# Patient Record
Sex: FEMALE | Race: WHITE
[De-identification: names, ages, dates, MRNs, and addresses within clinical notes are randomized per-mention and may not be internally consistent; named-entity substitution may affect disease eponyms.]

---

## 2017-10-06 ENCOUNTER — HOSPITAL ENCOUNTER (INPATIENT)
Dept: HOSPITAL 84 - D.ER | Age: 47
LOS: 5 days | Discharge: HOME | DRG: 673 | End: 2017-10-11
Attending: FAMILY MEDICINE | Admitting: FAMILY MEDICINE
Payer: OTHER GOVERNMENT

## 2017-10-06 VITALS — BODY MASS INDEX: 45.7 KG/M2 | BODY MASS INDEX: 47 KG/M2

## 2017-10-06 VITALS — SYSTOLIC BLOOD PRESSURE: 164 MMHG | DIASTOLIC BLOOD PRESSURE: 83 MMHG

## 2017-10-06 DIAGNOSIS — D50.9: ICD-10-CM

## 2017-10-06 DIAGNOSIS — D63.1: ICD-10-CM

## 2017-10-06 DIAGNOSIS — N17.9: ICD-10-CM

## 2017-10-06 DIAGNOSIS — E83.51: ICD-10-CM

## 2017-10-06 DIAGNOSIS — N18.6: ICD-10-CM

## 2017-10-06 DIAGNOSIS — Q60.0: ICD-10-CM

## 2017-10-06 DIAGNOSIS — E87.2: ICD-10-CM

## 2017-10-06 DIAGNOSIS — I12.0: Primary | ICD-10-CM

## 2017-10-06 LAB
ALBUMIN SERPL-MCNC: 3.1 G/DL (ref 3.4–5)
ALP SERPL-CCNC: 51 U/L (ref 46–116)
ALT SERPL-CCNC: 51 U/L (ref 10–68)
ANION GAP SERPL CALC-SCNC: 21.4 MMOL/L (ref 8–16)
APPEARANCE UR: (no result)
APPEARANCE UR: CLEAR
BACTERIA #/AREA URNS HPF: (no result) /HPF
BACTERIA #/AREA URNS HPF: (no result) /HPF
BASOPHILS NFR BLD AUTO: 0.1 % (ref 0–2)
BILIRUB SERPL-MCNC: 0.4 MG/DL (ref 0.2–1.3)
BILIRUB SERPL-MCNC: NEGATIVE MG/DL
BILIRUB SERPL-MCNC: NEGATIVE MG/DL
BUN SERPL-MCNC: 100 MG/DL (ref 7–18)
C3 SERPL-MCNC: 98 MG/DL (ref 90–207)
C4 SERPL-MCNC: 20.4 MG/DL (ref 17.4–52.2)
CALCIUM SERPL-MCNC: 6 MG/DL (ref 8.5–10.1)
CHLORIDE SERPL-SCNC: 105 MMOL/L (ref 98–107)
CO2 SERPL-SCNC: 17.7 MMOL/L (ref 21–32)
COLOR UR: (no result)
COLOR UR: (no result)
CREAT SERPL-MCNC: 11.7 MG/DL (ref 0.6–1.3)
CREATININE - URINE: 24.6 MG/DL (ref 30–125)
EOSINOPHIL NFR BLD: 2.4 % (ref 0–7)
ERYTHROCYTE [DISTWIDTH] IN BLOOD BY AUTOMATED COUNT: 13.6 % (ref 11.5–14.5)
GLOBULIN SER-MCNC: 3 G/L
GLUCOSE SERPL-MCNC: 100 MG/DL
GLUCOSE SERPL-MCNC: 116 MG/DL (ref 74–106)
GLUCOSE SERPL-MCNC: NEGATIVE MG/DL
HCT VFR BLD CALC: 22.9 % (ref 36–48)
HGB BLD-MCNC: 7.5 G/DL (ref 12–16)
IMM GRANULOCYTES NFR BLD: 0.4 % (ref 0–5)
KETONES UR STRIP-MCNC: NEGATIVE MG/DL
KETONES UR STRIP-MCNC: NEGATIVE MG/DL
LYMPHOCYTES NFR BLD AUTO: 9.2 % (ref 15–50)
MCH RBC QN AUTO: 29.8 PG (ref 26–34)
MCHC RBC AUTO-ENTMCNC: 32.8 G/DL (ref 31–37)
MCV RBC: 90.9 FL (ref 80–100)
MONOCYTES NFR BLD: 6.2 % (ref 2–11)
NEUTROPHILS NFR BLD AUTO: 81.7 % (ref 40–80)
NITRITE UR-MCNC: NEGATIVE MG/ML
NITRITE UR-MCNC: NEGATIVE MG/ML
OSMOLALITY SERPL CALC.SUM OF ELEC: 309 MOSM/KG (ref 275–300)
PH UR STRIP: 5 [PH] (ref 5–6)
PH UR STRIP: 6 [PH] (ref 5–6)
PLATELET # BLD: 371 10X3/UL (ref 130–400)
PMV BLD AUTO: 8.6 FL (ref 7.4–10.4)
POTASSIUM SERPL-SCNC: 5.1 MMOL/L (ref 3.5–5.1)
PROT SERPL-MCNC: 6.1 G/DL (ref 6.4–8.2)
PROT UR-MCNC: (no result) MG/DL
PROT UR-MCNC: (no result) MG/DL
PROT UR-MCNC: 105.6 MG/DL (ref 0–11.9)
PROT/CREAT UR: 4.3 MG/G
RBC # BLD AUTO: 2.52 10X6/UL (ref 4–5.4)
RBC #/AREA URNS HPF: (no result) /HPF (ref 0–5)
SODIUM SERPL-SCNC: 139 MMOL/L (ref 136–145)
SODIUM UR-SCNC: 70 MMOL/L (ref 20–110)
SP GR UR STRIP: 1.01 (ref 1–1.02)
SP GR UR STRIP: 1.01 (ref 1–1.02)
UROBILINOGEN UR-MCNC: NORMAL MG/DL
UROBILINOGEN UR-MCNC: NORMAL MG/DL
WBC # BLD AUTO: 10.2 10X3/UL (ref 4.8–10.8)
WBC #/AREA URNS HPF: (no result) /HPF (ref 0–5)
WBC #/AREA URNS HPF: (no result) /HPF (ref 0–5)

## 2017-10-06 NOTE — NUR
PT RECEIVED TO ROOM 2132, VIA WHEELCHAIR, ORIENTED PT TO ROOM AND CALL LIGHT.
EXPLAINED RATIONAL FOR SCD'S TO PT, PT REFUSED TO WEAR SCD'S AT THIS TIME.
PT'S BP A LITTLE HIGH, WILL ADMINISTER CLONODIN SCHEDULED FOR 1500. PT
ASSESSED AT THIS TIME. PT DENIES ANY NEEDS, CALL LIGHT IN REACH, WILL START
PLAN OF CARE.

## 2017-10-06 NOTE — NUR
PRBC INFUSING. VSS. DENIES ANY NEEDS OR PAIN AT THIS TIME. BED IN LOW
POSITION, CALL LIGHT WITHIN REACH.

## 2017-10-06 NOTE — NUR
ASKED PT TO APPLY SCD'S PER ORDER. PT STATED SHE WANTED TO WAIT TO PUT THEM ON
UNTIL THE MORNING BECAUSE THEY INTERFERE WITH HER GETTING REST.

## 2017-10-06 NOTE — NUR
PRBC INFUSING  ML/HR. VSS. PT RESTING QUIETLY IN BED.  PRESENT IN
ROOM. DENIES ANY PAIN OR NEEDS AT THIS TIME.

## 2017-10-06 NOTE — NUR
PT IN BED RESTING QUIETLY. RECIEVING BLOOD TRANSFUSION. VSS. DENIES ANY PAIN
OR DISCOMFORT AT THIS TIME. BED IN LOW POSITION, CALL LIGHT WITHIN REACH.

## 2017-10-06 NOTE — NUR
SECOND UNIT OF PRBC STARTED. BASELINE VITALS BP:148/87, HR:82, TEMP:98.0,
RR:16. VSS. DENIES ANY PAIN OR NEEDS AT THIS TIME. BED IN LOW POSITION, CALL
LIGHT WITHIN REACH.

## 2017-10-06 NOTE — NUR
FIRST UNIT OF PRBC'S STARTED INFUSING AT THIS TIME. VITAL SIGNS STABLE. PT
DENIES ANY NEEDS AT THIS TIME. CALL LIGHT IN REACH, NAD NOTED, WILL CONTINUE
TO MONITOR.
 
1650- BLOOD CONSENT SIGNED AND PLACED ON CHART.

## 2017-10-06 NOTE — NUR
PT IN BED RESTING QUIETLY. VSS. PRBC INFUSING  ML/HR. DENIES ANY NEEDS
AT THIS TIME. BED IN LOW POSITON, CALL LIGHT WITHIN REACH.

## 2017-10-07 VITALS — SYSTOLIC BLOOD PRESSURE: 163 MMHG | DIASTOLIC BLOOD PRESSURE: 93 MMHG

## 2017-10-07 VITALS — SYSTOLIC BLOOD PRESSURE: 138 MMHG | DIASTOLIC BLOOD PRESSURE: 94 MMHG

## 2017-10-07 VITALS — DIASTOLIC BLOOD PRESSURE: 87 MMHG | SYSTOLIC BLOOD PRESSURE: 166 MMHG

## 2017-10-07 VITALS — DIASTOLIC BLOOD PRESSURE: 87 MMHG | SYSTOLIC BLOOD PRESSURE: 143 MMHG

## 2017-10-07 VITALS — SYSTOLIC BLOOD PRESSURE: 146 MMHG | DIASTOLIC BLOOD PRESSURE: 89 MMHG

## 2017-10-07 VITALS — SYSTOLIC BLOOD PRESSURE: 143 MMHG | DIASTOLIC BLOOD PRESSURE: 96 MMHG

## 2017-10-07 LAB
ANION GAP SERPL CALC-SCNC: 21.4 MMOL/L (ref 8–16)
ANION GAP SERPL CALC-SCNC: 23.2 MMOL/L (ref 8–16)
BASOPHILS NFR BLD AUTO: 0.2 % (ref 0–2)
BUN SERPL-MCNC: 89 MG/DL (ref 7–18)
BUN SERPL-MCNC: 90 MG/DL (ref 7–18)
CALCIUM SERPL-MCNC: 5.9 MG/DL (ref 8.5–10.1)
CALCIUM SERPL-MCNC: 6.2 MG/DL (ref 8.5–10.1)
CHLORIDE SERPL-SCNC: 102 MMOL/L (ref 98–107)
CHLORIDE SERPL-SCNC: 103 MMOL/L (ref 98–107)
CO2 SERPL-SCNC: 16.5 MMOL/L (ref 21–32)
CO2 SERPL-SCNC: 18.5 MMOL/L (ref 21–32)
CREAT SERPL-MCNC: 11.1 MG/DL (ref 0.6–1.3)
CREAT SERPL-MCNC: 11.2 MG/DL (ref 0.6–1.3)
EOSINOPHIL NFR BLD: 1.6 % (ref 0–7)
ERYTHROCYTE [DISTWIDTH] IN BLOOD BY AUTOMATED COUNT: 15.2 % (ref 11.5–14.5)
ERYTHROCYTE [SEDIMENTATION RATE] IN BLOOD: 20 MM/HR (ref 0–20)
GLUCOSE SERPL-MCNC: 105 MG/DL (ref 74–106)
GLUCOSE SERPL-MCNC: 97 MG/DL (ref 74–106)
HCT VFR BLD CALC: 24.7 % (ref 36–48)
HGB BLD-MCNC: 8.1 G/DL (ref 12–16)
IMM GRANULOCYTES NFR BLD: 0.1 % (ref 0–5)
LYMPHOCYTES NFR BLD AUTO: 8 % (ref 15–50)
MCH RBC QN AUTO: 29.2 PG (ref 26–34)
MCHC RBC AUTO-ENTMCNC: 32.8 G/DL (ref 31–37)
MCV RBC: 89.2 FL (ref 80–100)
MONOCYTES NFR BLD: 7.1 % (ref 2–11)
NEUTROPHILS NFR BLD AUTO: 83 % (ref 40–80)
OSMOLALITY SERPL CALC.SUM OF ELEC: 300 MOSM/KG (ref 275–300)
OSMOLALITY SERPL CALC.SUM OF ELEC: 303 MOSM/KG (ref 275–300)
PLATELET # BLD: 323 10X3/UL (ref 130–400)
PMV BLD AUTO: 8.9 FL (ref 7.4–10.4)
POTASSIUM SERPL-SCNC: 4.7 MMOL/L (ref 3.5–5.1)
POTASSIUM SERPL-SCNC: 4.9 MMOL/L (ref 3.5–5.1)
RBC # BLD AUTO: 2.77 10X6/UL (ref 4–5.4)
SODIUM SERPL-SCNC: 137 MMOL/L (ref 136–145)
SODIUM SERPL-SCNC: 138 MMOL/L (ref 136–145)
WBC # BLD AUTO: 10 10X3/UL (ref 4.8–10.8)

## 2017-10-07 NOTE — NUR
SECOND UNIT OF PRBC DONE INFUSING. VSS. PT DENIES ANY PAIN OR NEEDS AT THIS
TIME. BED IN LOW POSITION, CALL LIGHT WITHIN REACH.

## 2017-10-07 NOTE — NUR
RECEIVED REPORT. ASSUMED CARE OF PATIENT. CALL LIGHT WITHIN REACH. PATIENT
LYING IN BED WITH EYES OPEN. STATES SHE FEELS BETTER THIS AM, FEELS LIKE SHE
HAS MORE ENERGY. RESP EVEN AND UNLABORED. STOOL SPECIMEN COLLECTED AT THIS
TIME. NO DISTRESS.

## 2017-10-07 NOTE — NUR
PT RESTING IN BED.  AT BEDSIDE. ALERT/ORIENTED. NA BICARB IV AT
100ML/HR INFUSING TO LEFT A/C. REFUSING SCDS. SEE SHIFT ASSESSMENT. CPOC.

## 2017-10-07 NOTE — NUR
ORDERED AS ROUTINE - STUDY EDITED TO DO AM ON 10/7/17 AS TECH ON CALL FOR
EMERGENCY ONLY.   GCAJOSE,RDMS  10/7/17

## 2017-10-07 NOTE — NUR
PAGED AGAIN. STILL AWAITING CALL BACK. CALLED CELL PHONE FOR [facility]
AND LEFT MESSAGE. WAITING CALL BACK.

## 2017-10-07 NOTE — NUR
RESTING IN BED WITH EYES OPEN. DENIES NEEDS. IV FLUIDS INFUSING AS ORDERED.
CALL LIGHT WITHIN REACH. NO DISTRESS.

## 2017-10-08 VITALS — SYSTOLIC BLOOD PRESSURE: 136 MMHG | DIASTOLIC BLOOD PRESSURE: 87 MMHG

## 2017-10-08 VITALS — SYSTOLIC BLOOD PRESSURE: 140 MMHG | DIASTOLIC BLOOD PRESSURE: 92 MMHG

## 2017-10-08 VITALS — DIASTOLIC BLOOD PRESSURE: 97 MMHG | SYSTOLIC BLOOD PRESSURE: 153 MMHG

## 2017-10-08 VITALS — SYSTOLIC BLOOD PRESSURE: 144 MMHG | DIASTOLIC BLOOD PRESSURE: 93 MMHG

## 2017-10-08 VITALS — DIASTOLIC BLOOD PRESSURE: 78 MMHG | SYSTOLIC BLOOD PRESSURE: 125 MMHG

## 2017-10-08 VITALS — DIASTOLIC BLOOD PRESSURE: 83 MMHG | SYSTOLIC BLOOD PRESSURE: 128 MMHG

## 2017-10-08 LAB
ANION GAP SERPL CALC-SCNC: 21 MMOL/L (ref 8–16)
BASOPHILS NFR BLD AUTO: 0.1 % (ref 0–2)
BUN SERPL-MCNC: 85 MG/DL (ref 7–18)
CALCIUM SERPL-MCNC: 6.1 MG/DL (ref 8.5–10.1)
CHLORIDE SERPL-SCNC: 104 MMOL/L (ref 98–107)
CHOLEST/HDLC SERPL: 2.5 RATIO (ref 2.3–4.1)
CO2 SERPL-SCNC: 17.8 MMOL/L (ref 21–32)
CREAT SERPL-MCNC: 11 MG/DL (ref 0.6–1.3)
EOSINOPHIL NFR BLD: 3.2 % (ref 0–7)
ERYTHROCYTE [DISTWIDTH] IN BLOOD BY AUTOMATED COUNT: 14.8 % (ref 11.5–14.5)
EST. AVERAGE GLUCOSE BLD GHB EST-MCNC: 103 MG/DL (ref 74–154)
FERRITIN SERPL-MCNC: 88 NG/ML (ref 3–244)
GLUCOSE SERPL-MCNC: 105 MG/DL (ref 74–106)
HBA1C MFR BLD: 5.2 % (ref 4.8–6)
HCT VFR BLD CALC: 24.4 % (ref 36–48)
HDLC SERPL-MCNC: 41 MG/DL (ref 32–96)
HGB BLD-MCNC: 8 G/DL (ref 12–16)
IMM GRANULOCYTES NFR BLD: 0.3 % (ref 0–5)
IRON SERPL-MCNC: 31 UG/DL (ref 35–150)
LDL-HDL RATIO: 1.3 RATIO (ref 1.5–3.5)
LDLC SERPL-MCNC: 54 MG/DL (ref 0–100)
LYMPHOCYTES NFR BLD AUTO: 14.2 % (ref 15–50)
MAGNESIUM SERPL-MCNC: 1.8 MG/DL (ref 1.8–2.4)
MCH RBC QN AUTO: 29.2 PG (ref 26–34)
MCHC RBC AUTO-ENTMCNC: 32.8 G/DL (ref 31–37)
MCV RBC: 89.1 FL (ref 80–100)
MONOCYTES NFR BLD: 7.7 % (ref 2–11)
NEUTROPHILS NFR BLD AUTO: 74.5 % (ref 40–80)
OSMOLALITY SERPL CALC.SUM OF ELEC: 301 MOSM/KG (ref 275–300)
PHOSPHATE SERPL-MCNC: 8.1 MG/DL (ref 2.5–4.9)
PLATELET # BLD: 280 10X3/UL (ref 130–400)
PMV BLD AUTO: 8.4 FL (ref 7.4–10.4)
POTASSIUM SERPL-SCNC: 4.8 MMOL/L (ref 3.5–5.1)
RBC # BLD AUTO: 2.74 10X6/UL (ref 4–5.4)
SAO2 % BLD FROM PO2: 13 % (ref 15–55)
SODIUM SERPL-SCNC: 138 MMOL/L (ref 136–145)
TIBC SERPL-MCNC: 226 UG/DL (ref 260–445)
TRIGL SERPL-MCNC: 48 MG/DL (ref 30–200)
UIBC SERPL-MCNC: 195 UG/DL (ref 150–375)
WBC # BLD AUTO: 7.8 10X3/UL (ref 4.8–10.8)

## 2017-10-08 NOTE — NUR
SPOKE WITH  AND RECEIVED ORDERS TO CHANGE ORDERS TO "LAPROSCOPIC
PERITONEAL DIALYSIS CATHETER PLACEMENT WITH  OR ".

## 2017-10-08 NOTE — NUR
SPOKE WITH JESSICA AT THIS TIME AFTER REVIEWING  PROGRESS NOTE. PATIENT
WAS CONSULTED FOR HEMESPLIT PLACEMENT BY  BUT  STATES THAT
PATIENT DOES NOT NEED HD AT THIS TIME, NEEDS PD CATHETER PLACEMENT AND CAN
START PD IN A COUPLE OF WEEKS. LU GAVE ORDERS TO CALL DR. JASSO AND
NOTIFY HIM OF THE CHANGE AND PLACE NEW ORDERS FOR PD CATHETER PLACEMENT.
 PAGED AT THIS TIME.

## 2017-10-08 NOTE — NUR
NEW BAG OF IVF UP AND INFUSING. PT RESTING WITH EYES CLOSED. RESPS
EVEN/NONLABORED. CPOC. CALL LIGHT IN REACH.

## 2017-10-08 NOTE — NUR
CONSENTS SIGNED AND ON THE CHART FOR TOMORROWS PROCEDURE. NPO AFTER MIDNIGHT
SIGN PLACED ON PATIENT DOOR AND MADE AWARE NOT TO EAT OR DRINK AFTER MIDNIGHT
TONIGHT. VERBALZIED HER UNDERSTANDING.

## 2017-10-08 NOTE — NUR
RECEIVED REPORT. ASSUMED CARE OF PATIENT. CALL LIGHT WITHIN REACH. PATIENT
RESTING WITH EYES OPEN. DENIES NEEDS THIS AM. STATES SHE SLEPT WELL LAST NOC.
IV FLUIDS INFUSING AS ORDERD. NO DISTRESS.

## 2017-10-08 NOTE — NUR
PT AWAKE, ALERT, ORIENTED, SITTING UP IN BED, DENIES ANY NEEDS. PT DENIES ANY
QUESTIONS ABOUT UPCOMING PROCEDURE TOMORROW. CONTINUE TO MONITOR CLOSELY. BED
LOW, CALL LIGHT IN REACH, SIDE RAILS X 2, HOB 10-15 DEGREES.

## 2017-10-09 VITALS — DIASTOLIC BLOOD PRESSURE: 100 MMHG | SYSTOLIC BLOOD PRESSURE: 158 MMHG

## 2017-10-09 VITALS — SYSTOLIC BLOOD PRESSURE: 168 MMHG | DIASTOLIC BLOOD PRESSURE: 102 MMHG

## 2017-10-09 VITALS — SYSTOLIC BLOOD PRESSURE: 142 MMHG | DIASTOLIC BLOOD PRESSURE: 93 MMHG

## 2017-10-09 VITALS — DIASTOLIC BLOOD PRESSURE: 90 MMHG | SYSTOLIC BLOOD PRESSURE: 128 MMHG

## 2017-10-09 VITALS — SYSTOLIC BLOOD PRESSURE: 145 MMHG | DIASTOLIC BLOOD PRESSURE: 86 MMHG

## 2017-10-09 VITALS — SYSTOLIC BLOOD PRESSURE: 140 MMHG | DIASTOLIC BLOOD PRESSURE: 86 MMHG

## 2017-10-09 LAB
ANION GAP SERPL CALC-SCNC: 21.3 MMOL/L (ref 8–16)
BASOPHILS NFR BLD AUTO: 0.1 % (ref 0–2)
BUN SERPL-MCNC: 83 MG/DL (ref 7–18)
CALCIUM SERPL-MCNC: 6.3 MG/DL (ref 8.5–10.1)
CHLORIDE SERPL-SCNC: 102 MMOL/L (ref 98–107)
CO2 SERPL-SCNC: 19.4 MMOL/L (ref 21–32)
CREAT SERPL-MCNC: 10.9 MG/DL (ref 0.6–1.3)
EOSINOPHIL NFR BLD: 3 % (ref 0–7)
ERYTHROCYTE [DISTWIDTH] IN BLOOD BY AUTOMATED COUNT: 14.6 % (ref 11.5–14.5)
GLUCOSE SERPL-MCNC: 107 MG/DL (ref 74–106)
HCT VFR BLD CALC: 24.8 % (ref 36–48)
HGB BLD-MCNC: 8.2 G/DL (ref 12–16)
IMM GRANULOCYTES NFR BLD: 0.2 % (ref 0–5)
LYMPHOCYTES NFR BLD AUTO: 12.4 % (ref 15–50)
MAGNESIUM SERPL-MCNC: 1.6 MG/DL (ref 1.8–2.4)
MCH RBC QN AUTO: 29.4 PG (ref 26–34)
MCHC RBC AUTO-ENTMCNC: 33.1 G/DL (ref 31–37)
MCV RBC: 88.9 FL (ref 80–100)
MONOCYTES NFR BLD: 6.9 % (ref 2–11)
NEUTROPHILS NFR BLD AUTO: 77.4 % (ref 40–80)
OSMOLALITY SERPL CALC.SUM OF ELEC: 300 MOSM/KG (ref 275–300)
PHOSPHATE SERPL-MCNC: 8 MG/DL (ref 2.5–4.9)
PLATELET # BLD: 307 10X3/UL (ref 130–400)
PMV BLD AUTO: 8.6 FL (ref 7.4–10.4)
POTASSIUM SERPL-SCNC: 4.7 MMOL/L (ref 3.5–5.1)
RBC # BLD AUTO: 2.79 10X6/UL (ref 4–5.4)
SODIUM SERPL-SCNC: 138 MMOL/L (ref 136–145)
WBC # BLD AUTO: 8.7 10X3/UL (ref 4.8–10.8)

## 2017-10-09 NOTE — NUR
MORNING MEDICATION GIVEN, ASSESSMENT DONE. PATIENT DENIES ANY NEEDS. BED LOW
AND LOCKED, CALL LIGHT IN REACH. CPOC.

## 2017-10-09 NOTE — NUR
DR JASSO AT BEDSIDE, EXPLAINED TO PATIENT THAT THE SURGERY GOT PUSHED BACK
DUE TO EMERGENCIES, AND HE IS GOING TO DO SURGERY TOMORROW AM, PATIENT STATES
UNDERSTANDING. WILL GET PATIENT A LUNCH TRAY. CPOC

## 2017-10-09 NOTE — NUR
Nutrition Follow Up:
Pt is NPO for PD Cath placement. Prior to this pt was eating 100% meal avg on
a renal diet. +BM 10/8/17. Wt stable. Meds and labs reviewed.
Rec resuming diet when medically feasible.
RD following.

## 2017-10-09 NOTE — NUR
PT AWAKE, ALERT, ORIENTED, DENIES ANY NEEDS. PT UNHOOKED FROM IV SO SHE CAN
TAKE A SHOWER. CONTINUE TO MONITOR.

## 2017-10-09 NOTE — NUR
PATIENT EATING DINNER, DENIES ANY NEEDS OR PAIN AT THIS TIME. PATIENT HAS R FA
IV WITH SODIUM BICARB INFUSING AT 100ML/HR. BED LOW AND LOCKED. CPOC

## 2017-10-09 NOTE — NUR
PT LYING IN BED, EYES CLOSED, RESPIRATIONS EVEN AND UNLABORED. CONTINUE TO
MONITOR CLOSELY. BED LOW, CALL LIGHT IN REACH, HOB FLAT, SIDE RAILS X 2.

## 2017-10-09 NOTE — NUR
RECIEVED REPORT ON PATIENT, PATIENT IS ALERT AND ORIENTED AT THIS TIME.
PATIENT HAS A L AC IV THAT IS SL AT THIS TIME. PATIENT BED IS LOW AND LOCKED.
PATIENT DENIES ANY NEEDS. CALL LIGHT IN REACH. WILL CONT TO MONITOR PATIENT.
CPOC

## 2017-10-09 NOTE — NUR
Patient Name: MILTON YOUNG
Encounter No: [id number]
: 1970
Primary Insurance: [employer] ACTIVE DUTY
Anticipated DC Date:
Planned Disposition: Home
 
 
DISCHARGE PLANNING NOTE: CM RECEIVED ORDER FOR OUTPATIENT DIALYSIS CLINIC
ARRANGEMENT. CM NOTIFIED [facility] COORDINATOR GIOVANY LAW,.
[phone] AND WILL FOLLOW UP WITH PT TOMORROW.
 
Aleksandr Miranda, CASE MANAGEMENT

## 2017-10-09 NOTE — NUR
ROUNDING NOTE: PT SITTING UP IN BED WATCHING TV IN BED. PT WAS PLANNED TO HAVE
PD CATH PLACEMENT TODAY, BUT HER PROCEDURE WAS BUMPED FOR EMERGENT PROCEDURES
THAT CAME IN; THEREFORE, HER PROCEDURE WILL BE TOMORROW MORNING AT 0900.
WILL RE-SIGN CONSENT AND PREOP PATIENT AGAIN IN THE MORNING. PT WITH RIGHT
FOREARM W/ SODIUM BICARB RUNNING AT 100CC/HR. PT DENIES ANY NEEDS. WILL CONT
TO MONITOR.

## 2017-10-09 NOTE — NUR
PATIENT IV INFILTRATED, IV DC WITH CATH TIP INTACT. IV STARTED IN R FA, 20 PER
VERA MORRIS LPN. SODIUM BICARB INFUSING AT 100ML/HR. PATIENT DENIES ANY NEEDS.
CPOC

## 2017-10-10 VITALS — DIASTOLIC BLOOD PRESSURE: 96 MMHG | SYSTOLIC BLOOD PRESSURE: 141 MMHG

## 2017-10-10 VITALS — SYSTOLIC BLOOD PRESSURE: 158 MMHG | DIASTOLIC BLOOD PRESSURE: 96 MMHG

## 2017-10-10 VITALS — SYSTOLIC BLOOD PRESSURE: 144 MMHG | DIASTOLIC BLOOD PRESSURE: 86 MMHG

## 2017-10-10 VITALS — SYSTOLIC BLOOD PRESSURE: 152 MMHG | DIASTOLIC BLOOD PRESSURE: 95 MMHG

## 2017-10-10 VITALS — SYSTOLIC BLOOD PRESSURE: 155 MMHG | DIASTOLIC BLOOD PRESSURE: 93 MMHG

## 2017-10-10 VITALS — SYSTOLIC BLOOD PRESSURE: 134 MMHG | DIASTOLIC BLOOD PRESSURE: 88 MMHG

## 2017-10-10 LAB
ALBUMIN SERPL ELPH-MCNC: 3.1 G/DL (ref 2.9–4.4)
ALBUMIN/GLOB SERPL: 1.3 {RATIO} (ref 0.7–1.7)
ALPHA1 GLOB SERPL ELPH-MCNC: 0.3 G/DL (ref 0–0.4)
ALPHA2 GLOB SERPL ELPH-MCNC: 0.7 G/DL (ref 0.4–1)
ANION GAP SERPL CALC-SCNC: 20.4 MMOL/L (ref 8–16)
B-GLOBULIN SERPL ELPH-MCNC: 0.8 G/DL (ref 0.7–1.3)
BASOPHILS NFR BLD AUTO: 0.2 % (ref 0–2)
BUN SERPL-MCNC: 90 MG/DL (ref 7–18)
CALCIUM SERPL-MCNC: 6.2 MG/DL (ref 8.5–10.1)
CHLORIDE SERPL-SCNC: 105 MMOL/L (ref 98–107)
CO2 SERPL-SCNC: 20.2 MMOL/L (ref 21–32)
CREAT SERPL-MCNC: 11.1 MG/DL (ref 0.6–1.3)
EOSINOPHIL NFR BLD: 2.3 % (ref 0–7)
ERYTHROCYTE [DISTWIDTH] IN BLOOD BY AUTOMATED COUNT: 14.5 % (ref 11.5–14.5)
FOLATE SERPL-MCNC: 9.3 NG/ML (ref 3–?)
GAMMA GLOB SERPL ELPH-MCNC: 0.6 G/DL (ref 0.4–1.8)
GLUCOSE SERPL-MCNC: 106 MG/DL (ref 74–106)
HCT VFR BLD CALC: 25 % (ref 36–48)
HCV AB S/CO SERPL IA: 0.2 (ref 0–0.9)
HGB BLD-MCNC: 8.4 G/DL (ref 12–16)
IMM GRANULOCYTES NFR BLD: 0.3 % (ref 0–5)
LYMPHOCYTES NFR BLD AUTO: 10.6 % (ref 15–50)
MAGNESIUM SERPL-MCNC: 1.4 MG/DL (ref 1.8–2.4)
MCH RBC QN AUTO: 30 PG (ref 26–34)
MCHC RBC AUTO-ENTMCNC: 33.6 G/DL (ref 31–37)
MCV RBC: 89.3 FL (ref 80–100)
MONOCYTES NFR BLD: 7.3 % (ref 2–11)
NEUTROPHILS NFR BLD AUTO: 79.3 % (ref 40–80)
OSMOLALITY SERPL CALC.SUM OF ELEC: 308 MOSM/KG (ref 275–300)
PHOSPHATE SERPL-MCNC: 7.5 MG/DL (ref 2.5–4.9)
PLATELET # BLD: 321 10X3/UL (ref 130–400)
PMV BLD AUTO: 8.8 FL (ref 7.4–10.4)
POTASSIUM SERPL-SCNC: 4.6 MMOL/L (ref 3.5–5.1)
PROT SERPL-MCNC: 5.5 G/DL (ref 6–8.5)
RBC # BLD AUTO: 2.8 10X6/UL (ref 4–5.4)
SODIUM SERPL-SCNC: 141 MMOL/L (ref 136–145)
VIT B12 SERPL-MCNC: 601 PG/ML (ref 211–946)
WBC # BLD AUTO: 9.8 10X3/UL (ref 4.8–10.8)

## 2017-10-10 PROCEDURE — 0WHG43Z INSERTION OF INFUSION DEVICE INTO PERITONEAL CAVITY, PERCUTANEOUS ENDOSCOPIC APPROACH: ICD-10-PCS | Performed by: SURGERY

## 2017-10-10 NOTE — NUR
RECEIVED CALL BACK FROM RENAL APN TO D/C PT'S IVF D/T PT'S CXR RESULTS SHOWING
MILD CHF. PT NOW W/ SALINE LOC.

## 2017-10-10 NOTE — NUR
RECEIVED PT BACK FROM RECOVERY IN STABLE CONDITION VIA BED VSS BANDAID DRSG
INTACT TO ABD X2 PD CATHETER WITH DRSG C/D/I NAD NOTED

## 2017-10-10 NOTE — NUR
ROUNDING NOTE: PT IS AWAKE AND SITTING UP IN BED AT START OF SHIFT. SHE HAD
HER PD CATH PLACED TODAY. SHE HAD PD CATH WITH DRESSING WITH SCANT AMOUNT OF
SEROSANGINOUS DRAINAGE AND TWO BANDAIDS TO ABDOMEN THAT ARE C/D/I. PT
CONTINUES ON SODIUM BICARB AT 100CC/HR VIA RIGHT FOREARM IV SITE. CXRAY WAS
DONE TODAY D/T PT C/O COUGH, WHICH REVEALS MILD CHF. CALLED RENAL ANSWERING
SERVICE, AWAITING RENAL APN CALL BACK. PT STATES THAT SHE FEELS WELL AND IS
WITHOUT COMPLAINT. WILL CONT TO MONITOR.

## 2017-10-11 VITALS — SYSTOLIC BLOOD PRESSURE: 181 MMHG | DIASTOLIC BLOOD PRESSURE: 108 MMHG

## 2017-10-11 VITALS — DIASTOLIC BLOOD PRESSURE: 97 MMHG | SYSTOLIC BLOOD PRESSURE: 155 MMHG

## 2017-10-11 VITALS — SYSTOLIC BLOOD PRESSURE: 147 MMHG | DIASTOLIC BLOOD PRESSURE: 95 MMHG

## 2017-10-11 VITALS — SYSTOLIC BLOOD PRESSURE: 171 MMHG | DIASTOLIC BLOOD PRESSURE: 104 MMHG

## 2017-10-11 LAB
ALBUMIN MFR UR ELPH: 58.8 %
ALPHA1 GLOB MFR UR ELPH: 6.5 %
ALPHA2 GLOB 24H MFR UR ELPH: 9.4 %
ANION GAP SERPL CALC-SCNC: 21.4 MMOL/L (ref 8–16)
B-GLOBULIN 24H MFR UR ELPH: 13.3 %
BASOPHILS NFR BLD AUTO: 0.1 % (ref 0–2)
BUN SERPL-MCNC: 88 MG/DL (ref 7–18)
C-ANCA TITR SER IF: (no result) TITER
CALCIUM SERPL-MCNC: 6.6 MG/DL (ref 8.5–10.1)
CHLORIDE SERPL-SCNC: 104 MMOL/L (ref 98–107)
CO2 SERPL-SCNC: 19 MMOL/L (ref 21–32)
CREAT SERPL-MCNC: 11.1 MG/DL (ref 0.6–1.3)
EOSINOPHIL NFR BLD: 1.2 % (ref 0–7)
ERYTHROCYTE [DISTWIDTH] IN BLOOD BY AUTOMATED COUNT: 14.6 % (ref 11.5–14.5)
GAMMA GLOB 24H MFR UR ELPH: 12 %
GLUCOSE SERPL-MCNC: 110 MG/DL (ref 74–106)
HCT VFR BLD CALC: 25.8 % (ref 36–48)
HGB BLD-MCNC: 8.4 G/DL (ref 12–16)
IMM GRANULOCYTES NFR BLD: 0.4 % (ref 0–5)
LYMPHOCYTES NFR BLD AUTO: 7.9 % (ref 15–50)
MAGNESIUM SERPL-MCNC: 1.4 MG/DL (ref 1.8–2.4)
MCH RBC QN AUTO: 29.2 PG (ref 26–34)
MCHC RBC AUTO-ENTMCNC: 32.6 G/DL (ref 31–37)
MCV RBC: 89.6 FL (ref 80–100)
MONOCYTES NFR BLD: 8.6 % (ref 2–11)
MYELOPEROXIDASE AB SER IA-ACNC: <9 U/ML (ref 0–9)
NEUTROPHILS NFR BLD AUTO: 81.8 % (ref 40–80)
OSMOLALITY SERPL CALC.SUM OF ELEC: 306 MOSM/KG (ref 275–300)
P-ANCA ATYPICAL TITR SER IF: (no result) TITER
P-ANCA TITR SER IF: (no result) TITER
PHOSPHATE SERPL-MCNC: 7.3 MG/DL (ref 2.5–4.9)
PLATELET # BLD: 323 10X3/UL (ref 130–400)
PMV BLD AUTO: 8.8 FL (ref 7.4–10.4)
POTASSIUM SERPL-SCNC: 4.4 MMOL/L (ref 3.5–5.1)
PROTEINASE3 AB SER IA-ACNC: <3.5 U/ML (ref 0–3.5)
RBC # BLD AUTO: 2.88 10X6/UL (ref 4–5.4)
SODIUM SERPL-SCNC: 140 MMOL/L (ref 136–145)
WBC # BLD AUTO: 11.3 10X3/UL (ref 4.8–10.8)

## 2017-10-11 NOTE — NUR
REVIEWED DISCHARGE INSTRUCTIONS WITH PT STATES UNDERSTANDING COPY GIVEN PT
DCD SALINE LOCK TO RFA WITH IV CATHETER INTACT SITE FREE FROM [facility] OR [facility]
PT DISCHARGED HOME LEFT UNIT IN STABLE CONDITION VIA W/C WITH ALL PERSONal
BELONGINGS

## 2017-10-11 NOTE — NUR
Patient Name: MILTON YOUNG Admission Status: ER
Accout number: [id number] Admission Date: 10-
: 1970 Admission Diagnosis:END STAGE RENAL DISEASE
Attending: MUSA POWELL Current LOS: 5
 
Anticipated DC Date: 10-
Planned Disposition: Home
Primary Insurance: [facility] ACTIVE DUTY
 
 
Discharge Planning Comments:
* Is the patient Alert and Oriented? Yes 0
* How many steps to enter\exit or inside your home? 5 0
* PCP DR. ARMANDO 0
* Pharmacy [facility] [address] 0
* Preadmission Environment Home with Family 0
* ADLs Independent 0
* Equipment None 0
* Other Equipment NO MEDICAL EQUIPMENT PROVIDER PREFERENCE 0
* List name and contact numbers for known caregivers / representatives who
currently or will assist patient after discharge: ASHLEY YOUNG, SPOUSE,
[phone] 0
* Community resources currently utilized None 0
* Please name any agencies selected above. NONE 0
* Additional services required to return to the preadmission environment? Yes
* Can the patient safely return to the preadmission environment? Yes 0
* Has this patient been hospitalized within the prior 30 days at any hospital?
No 0
 
CM MET WITH PT IN ROOM TO DISCUSS DISCHARGE PLANNING AND NEEDS. PT REPORTS
LIVING AT HOME INDEPENDENTLY WITH SPOUSE. PT HAS NO MEDICAL EQUIPMENT AND NO
OUTSIDE SERVICES ASSISTING IN THE HOME. CM DISCUSSED AVAILABILITY OF HOME
HEALTH, REHAB SERVICES AND MEDICAL EQUIPMENT. PT DENIES DISCHARGE NEEDS,
REPORTS GIOVANY OF [facility] IS WORKING ON HER OUTPATIENT PERITONEAL
DIALYSIS CLINIC ARRANGEMENTS AND HAS BOTH SHE AND SPOUSE'S CONTACT NUMBERS.
PT'S SPOUSE HERE TO TRANSPORT HOME AT DISCHARGE TODAY. CM CALLED GIOVANY LAW
OF [facility], [phone], WHO REPORTS SHE IS WORKING ON OUTPATIENT
ARRANGEMENTS AND WILL CONTACT PT AFTER DISCHARGE HOME WITH OUTPATIENT
APPOINTMENT INFORMATION ONCE ARRANGED. GIOVANY REPORTS HAVING CONTACT NUMBERS
FOR PT AND SPOUSE.  NOTIFIED.
 
: Aleksandr Miranda

## 2017-10-11 NOTE — NUR
FOR DISCHARGE, ASKED PATIENT IF SHE WANTED THE FLU SHOT AND SHE SAID SHE WAS
GOING TO HAVE ONE BEFORE THIS ADMISSION. SHE WANTS TO WAIT AND TALK TO HER
PRIMARY MD BEFORE SHE GETS ONE.

## 2017-10-12 LAB — GBM IGG SER-ACNC: 3 UNITS (ref 0–20)

## 2017-10-13 NOTE — OP
PATIENT NAME:  MILTON YOUNG                          MEDICAL RECORD: [id number]
:[birth year]                                             LOCATION:D.     D.2132
                                                         ADMISSION DATE:10/06/17
SURGEON:  JUAN JASSO MD          
 
 
DATE OF OPERATION:  10/10/2017
 
PREOPERATIVE DIAGNOSES:
1. End-stage renal disease.
2. Hypertension.
3. Chronic renal agenesis.
 
POSTOPERATIVE DIAGNOSES:
1. End-stage renal disease.
2. Hypertension.
3. Chronic renal agenesis.
 
PROCEDURES:
1.  Laparoscopic peritoneal dialysis catheter placement.
2.  Laparoscopic lysis of adhesions.
 
SURGEON:  Juan Jasso MD
 
REPORT OF PROCEDURE:  The patient's abdomen was prepped and draped in sterile
fashion.  A Veress needle was inserted in the left upper quadrant and abdomen
was insufflated.  A 5-mm Visiport trocar was inserted in the left lower quadrant
and at that point, we could see the Veress needle and there was no sign of any
injury to bowel or surrounding structures and another 5-mm trocar was then
placed in the midline just above the umbilicus.  The patient had some adhesions
present in the pelvic region of the omentum to the anterior abdominal wall and
these were all taken down carefully with sharp dissection.  At this point, the
pelvis was completely freed up.  An 8 mm trocar was inserted in the right lower
quadrant and tunneled out through the midline.  The right-sided peritoneal
dialysis catheter was placed through 8 mm trocar and the trocar was removed.  We
assured that the sheaths were in good position in the subcutaneous space.  The
pigtail was then placed in the pelvis just above the uterus.  We flushed it
appropriately without complications.  The catheter was then sutured into place
with 2-0 Prolene.  The wounds were then infused with 10 mL of 0.25% Marcaine
with epinephrine and then closed with subcutaneous 5-0 Monocryl.  The wounds
were then all dressed appropriately.
 
COMPLICATIONS:  None.
 
CONDITION:  Stable.
 
ANESTHESIA:  General endotracheal and local.
 
BLOOD LOSS:  Minimal.
 
TRANSINT:[id number] Voice Confirmation ID: [id number] DOCUMENT ID: [id number]
 
 
 
OPERATIVE REPORT                               [id number]    MILTON YOUNG        
 
 
                                           
                                           JUAN JASSO MD          
 
 
 
Electronically Signed by JUAN JASSO on 10/13/17 at 1236
 
 
 
 
 
 
 
 
 
 
 
 
 
 
 
 
 
 
 
 
 
 
 
 
 
 
 
 
 
 
 
 
 
 
 
 
 
 
 
 
 
CC:                                                             [id number]
DICTATION DATE: 10/10/17 0948     :     10/10/17 1038      DIS IN  
                                                                      10/11/17
[facility]                                          
 [address]                           
[place] [id number]

## 2017-10-31 ENCOUNTER — HOSPITAL ENCOUNTER (OUTPATIENT)
Dept: HOSPITAL 84 - D.MAMMO | Age: 47
Discharge: HOME | End: 2017-10-31
Attending: NURSE PRACTITIONER
Payer: OTHER GOVERNMENT

## 2017-10-31 VITALS — BODY MASS INDEX: 45.7 KG/M2

## 2017-10-31 DIAGNOSIS — Z12.31: Primary | ICD-10-CM

## 2018-02-22 ENCOUNTER — HOSPITAL ENCOUNTER (INPATIENT)
Dept: HOSPITAL 84 - D.ER | Age: 48
LOS: 5 days | Discharge: HOME | DRG: 193 | End: 2018-02-27
Attending: FAMILY MEDICINE | Admitting: FAMILY MEDICINE
Payer: OTHER GOVERNMENT

## 2018-02-22 VITALS
BODY MASS INDEX: 42.37 KG/M2 | HEIGHT: 68 IN | BODY MASS INDEX: 42.37 KG/M2 | HEIGHT: 68 IN | BODY MASS INDEX: 42.37 KG/M2 | WEIGHT: 279.59 LBS | WEIGHT: 279.59 LBS

## 2018-02-22 VITALS — SYSTOLIC BLOOD PRESSURE: 166 MMHG | DIASTOLIC BLOOD PRESSURE: 80 MMHG

## 2018-02-22 VITALS — SYSTOLIC BLOOD PRESSURE: 156 MMHG | DIASTOLIC BLOOD PRESSURE: 95 MMHG

## 2018-02-22 DIAGNOSIS — N17.9: ICD-10-CM

## 2018-02-22 DIAGNOSIS — J18.9: Primary | ICD-10-CM

## 2018-02-22 DIAGNOSIS — Q60.2: ICD-10-CM

## 2018-02-22 DIAGNOSIS — I12.0: ICD-10-CM

## 2018-02-22 DIAGNOSIS — J96.01: ICD-10-CM

## 2018-02-22 DIAGNOSIS — J81.0: ICD-10-CM

## 2018-02-22 DIAGNOSIS — D63.1: ICD-10-CM

## 2018-02-22 DIAGNOSIS — N18.6: ICD-10-CM

## 2018-02-22 DIAGNOSIS — E87.5: ICD-10-CM

## 2018-02-22 DIAGNOSIS — N25.81: ICD-10-CM

## 2018-02-22 LAB
ALBUMIN SERPL-MCNC: 3.5 G/DL (ref 3.4–5)
ALP SERPL-CCNC: 56 U/L (ref 46–116)
ALT SERPL-CCNC: 27 U/L (ref 10–68)
ANION GAP SERPL CALC-SCNC: 24.1 MMOL/L (ref 8–16)
BASOPHILS NFR BLD AUTO: 0.1 % (ref 0–2)
BILIRUB SERPL-MCNC: 0.47 MG/DL (ref 0.2–1.3)
BUN SERPL-MCNC: 112 MG/DL (ref 7–18)
CALCIUM SERPL-MCNC: 8.2 MG/DL (ref 8.5–10.1)
CHLORIDE SERPL-SCNC: 96 MMOL/L (ref 98–107)
CO2 SERPL-SCNC: 19.2 MMOL/L (ref 21–32)
CREAT SERPL-MCNC: 19.5 MG/DL (ref 0.6–1.3)
EOSINOPHIL NFR BLD: 1.4 % (ref 0–7)
ERYTHROCYTE [DISTWIDTH] IN BLOOD BY AUTOMATED COUNT: 14 % (ref 11.5–14.5)
GLOBULIN SER-MCNC: 3.8 G/L
GLUCOSE SERPL-MCNC: 106 MG/DL (ref 74–106)
HCT VFR BLD CALC: 33 % (ref 36–48)
HGB BLD-MCNC: 10.7 G/DL (ref 12–16)
IMM GRANULOCYTES NFR BLD: 0.2 % (ref 0–5)
LYMPHOCYTES NFR BLD AUTO: 5.8 % (ref 15–50)
MCH RBC QN AUTO: 29.2 PG (ref 26–34)
MCHC RBC AUTO-ENTMCNC: 32.4 G/DL (ref 31–37)
MCV RBC: 89.9 FL (ref 80–100)
MONOCYTES NFR BLD: 5.1 % (ref 2–11)
NEUTROPHILS NFR BLD AUTO: 87.4 % (ref 40–80)
OSMOLALITY SERPL CALC.SUM OF ELEC: 303 MOSM/KG (ref 275–300)
PLATELET # BLD: 238 10X3/UL (ref 130–400)
PMV BLD AUTO: 8.3 FL (ref 7.4–10.4)
POTASSIUM SERPL-SCNC: 5.3 MMOL/L (ref 3.5–5.1)
PROT SERPL-MCNC: 7.3 G/DL (ref 6.4–8.2)
RBC # BLD AUTO: 3.67 10X6/UL (ref 4–5.4)
SODIUM SERPL-SCNC: 134 MMOL/L (ref 136–145)
TROPONIN I SERPL-MCNC: < 0.017 NG/ML (ref 0–0.06)
WBC # BLD AUTO: 9.5 10X3/UL (ref 4.8–10.8)

## 2018-02-23 VITALS — DIASTOLIC BLOOD PRESSURE: 83 MMHG | SYSTOLIC BLOOD PRESSURE: 138 MMHG

## 2018-02-23 VITALS — SYSTOLIC BLOOD PRESSURE: 171 MMHG | DIASTOLIC BLOOD PRESSURE: 95 MMHG

## 2018-02-23 VITALS — SYSTOLIC BLOOD PRESSURE: 140 MMHG | DIASTOLIC BLOOD PRESSURE: 92 MMHG

## 2018-02-23 VITALS — DIASTOLIC BLOOD PRESSURE: 92 MMHG | SYSTOLIC BLOOD PRESSURE: 148 MMHG

## 2018-02-23 VITALS — DIASTOLIC BLOOD PRESSURE: 77 MMHG | SYSTOLIC BLOOD PRESSURE: 148 MMHG

## 2018-02-23 VITALS — SYSTOLIC BLOOD PRESSURE: 182 MMHG | DIASTOLIC BLOOD PRESSURE: 99 MMHG

## 2018-02-23 LAB
ANION GAP SERPL CALC-SCNC: 22.3 MMOL/L (ref 8–16)
BASOPHILS NFR BLD AUTO: 0.2 % (ref 0–2)
BUN SERPL-MCNC: 106 MG/DL (ref 7–18)
CALCIUM SERPL-MCNC: 7.9 MG/DL (ref 8.5–10.1)
CHLORIDE SERPL-SCNC: 97 MMOL/L (ref 98–107)
CO2 SERPL-SCNC: 20.6 MMOL/L (ref 21–32)
CREAT SERPL-MCNC: 19.7 MG/DL (ref 0.6–1.3)
EOSINOPHIL NFR BLD: 1.2 % (ref 0–7)
ERYTHROCYTE [DISTWIDTH] IN BLOOD BY AUTOMATED COUNT: 14.2 % (ref 11.5–14.5)
GLUCOSE SERPL-MCNC: 99 MG/DL (ref 74–106)
HCT VFR BLD CALC: 30 % (ref 36–48)
HGB BLD-MCNC: 9.4 G/DL (ref 12–16)
IMM GRANULOCYTES NFR BLD: 0.3 % (ref 0–5)
LYMPHOCYTES NFR BLD AUTO: 12.7 % (ref 15–50)
MCH RBC QN AUTO: 28.5 PG (ref 26–34)
MCHC RBC AUTO-ENTMCNC: 31.3 G/DL (ref 31–37)
MCV RBC: 90.9 FL (ref 80–100)
MONOCYTES NFR BLD: 10.8 % (ref 2–11)
NEUTROPHILS NFR BLD AUTO: 74.8 % (ref 40–80)
OSMOLALITY SERPL CALC.SUM OF ELEC: 302 MOSM/KG (ref 275–300)
PLATELET # BLD: 243 10X3/UL (ref 130–400)
PMV BLD AUTO: 8.7 FL (ref 7.4–10.4)
POTASSIUM SERPL-SCNC: 4.9 MMOL/L (ref 3.5–5.1)
RBC # BLD AUTO: 3.3 10X6/UL (ref 4–5.4)
SODIUM SERPL-SCNC: 135 MMOL/L (ref 136–145)
WBC # BLD AUTO: 6.5 10X3/UL (ref 4.8–10.8)

## 2018-02-24 VITALS — SYSTOLIC BLOOD PRESSURE: 151 MMHG | DIASTOLIC BLOOD PRESSURE: 87 MMHG

## 2018-02-24 VITALS — DIASTOLIC BLOOD PRESSURE: 56 MMHG | SYSTOLIC BLOOD PRESSURE: 154 MMHG

## 2018-02-24 VITALS — DIASTOLIC BLOOD PRESSURE: 77 MMHG | SYSTOLIC BLOOD PRESSURE: 146 MMHG

## 2018-02-24 VITALS — SYSTOLIC BLOOD PRESSURE: 165 MMHG | DIASTOLIC BLOOD PRESSURE: 87 MMHG

## 2018-02-24 VITALS — SYSTOLIC BLOOD PRESSURE: 149 MMHG | DIASTOLIC BLOOD PRESSURE: 89 MMHG

## 2018-02-24 VITALS — SYSTOLIC BLOOD PRESSURE: 140 MMHG | DIASTOLIC BLOOD PRESSURE: 87 MMHG

## 2018-02-24 LAB
ANION GAP SERPL CALC-SCNC: 19.9 MMOL/L (ref 8–16)
BASOPHILS NFR BLD AUTO: 0.1 % (ref 0–2)
BUN SERPL-MCNC: 100 MG/DL (ref 7–18)
CALCIUM SERPL-MCNC: 8.1 MG/DL (ref 8.5–10.1)
CHLORIDE SERPL-SCNC: 98 MMOL/L (ref 98–107)
CO2 SERPL-SCNC: 21.8 MMOL/L (ref 21–32)
CREAT SERPL-MCNC: 18.5 MG/DL (ref 0.6–1.3)
EOSINOPHIL NFR BLD: 2.4 % (ref 0–7)
ERYTHROCYTE [DISTWIDTH] IN BLOOD BY AUTOMATED COUNT: 14.2 % (ref 11.5–14.5)
GLUCOSE SERPL-MCNC: 109 MG/DL (ref 74–106)
HCT VFR BLD CALC: 30.2 % (ref 36–48)
HGB BLD-MCNC: 9.3 G/DL (ref 12–16)
IMM GRANULOCYTES NFR BLD: 0.7 % (ref 0–5)
LYMPHOCYTES NFR BLD AUTO: 13.6 % (ref 15–50)
MCH RBC QN AUTO: 28.2 PG (ref 26–34)
MCHC RBC AUTO-ENTMCNC: 30.8 G/DL (ref 31–37)
MCV RBC: 91.5 FL (ref 80–100)
MONOCYTES NFR BLD: 13.6 % (ref 2–11)
NEUTROPHILS NFR BLD AUTO: 69.6 % (ref 40–80)
OSMOLALITY SERPL CALC.SUM OF ELEC: 301 MOSM/KG (ref 275–300)
PLATELET # BLD: 213 10X3/UL (ref 130–400)
PMV BLD AUTO: 9.4 FL (ref 7.4–10.4)
POTASSIUM SERPL-SCNC: 4.7 MMOL/L (ref 3.5–5.1)
RBC # BLD AUTO: 3.3 10X6/UL (ref 4–5.4)
SODIUM SERPL-SCNC: 135 MMOL/L (ref 136–145)
WBC # BLD AUTO: 7.1 10X3/UL (ref 4.8–10.8)

## 2018-02-25 VITALS — SYSTOLIC BLOOD PRESSURE: 150 MMHG | DIASTOLIC BLOOD PRESSURE: 82 MMHG

## 2018-02-25 VITALS — DIASTOLIC BLOOD PRESSURE: 102 MMHG | SYSTOLIC BLOOD PRESSURE: 185 MMHG

## 2018-02-25 VITALS — DIASTOLIC BLOOD PRESSURE: 72 MMHG | SYSTOLIC BLOOD PRESSURE: 138 MMHG

## 2018-02-25 VITALS — DIASTOLIC BLOOD PRESSURE: 89 MMHG | SYSTOLIC BLOOD PRESSURE: 147 MMHG

## 2018-02-25 VITALS — SYSTOLIC BLOOD PRESSURE: 143 MMHG | DIASTOLIC BLOOD PRESSURE: 77 MMHG

## 2018-02-25 LAB
ANION GAP SERPL CALC-SCNC: 16.7 MMOL/L (ref 8–16)
APTT BLD: 26.9 SECONDS (ref 22.8–39.4)
BASOPHILS NFR BLD AUTO: 0.1 % (ref 0–2)
BUN SERPL-MCNC: 83 MG/DL (ref 7–18)
CALCIUM SERPL-MCNC: 7.6 MG/DL (ref 8.5–10.1)
CHLORIDE SERPL-SCNC: 97 MMOL/L (ref 98–107)
CO2 SERPL-SCNC: 25.5 MMOL/L (ref 21–32)
CREAT SERPL-MCNC: 17.1 MG/DL (ref 0.6–1.3)
EOSINOPHIL NFR BLD: 3 % (ref 0–7)
ERYTHROCYTE [DISTWIDTH] IN BLOOD BY AUTOMATED COUNT: 14.2 % (ref 11.5–14.5)
GLUCOSE SERPL-MCNC: 136 MG/DL (ref 74–106)
HCT VFR BLD CALC: 32.6 % (ref 36–48)
HGB BLD-MCNC: 10 G/DL (ref 12–16)
IMM GRANULOCYTES NFR BLD: 0.7 % (ref 0–5)
INR PPP: 1.01 (ref 0.85–1.17)
LYMPHOCYTES NFR BLD AUTO: 13.4 % (ref 15–50)
MCH RBC QN AUTO: 28.3 PG (ref 26–34)
MCHC RBC AUTO-ENTMCNC: 30.7 G/DL (ref 31–37)
MCV RBC: 92.4 FL (ref 80–100)
MONOCYTES NFR BLD: 10.8 % (ref 2–11)
NEUTROPHILS NFR BLD AUTO: 72 % (ref 40–80)
OSMOLALITY SERPL CALC.SUM OF ELEC: 296 MOSM/KG (ref 275–300)
PLATELET # BLD: 216 10X3/UL (ref 130–400)
PMV BLD AUTO: 9 FL (ref 7.4–10.4)
POTASSIUM SERPL-SCNC: 4.2 MMOL/L (ref 3.5–5.1)
PROTHROMBIN TIME: 12.9 SECONDS (ref 11.6–15)
RBC # BLD AUTO: 3.53 10X6/UL (ref 4–5.4)
SODIUM SERPL-SCNC: 135 MMOL/L (ref 136–145)
WBC # BLD AUTO: 7 10X3/UL (ref 4.8–10.8)

## 2018-02-25 PROCEDURE — 0W993ZZ DRAINAGE OF RIGHT PLEURAL CAVITY, PERCUTANEOUS APPROACH: ICD-10-PCS | Performed by: RADIOLOGY

## 2018-02-26 VITALS — DIASTOLIC BLOOD PRESSURE: 71 MMHG | SYSTOLIC BLOOD PRESSURE: 142 MMHG

## 2018-02-26 VITALS — SYSTOLIC BLOOD PRESSURE: 146 MMHG | DIASTOLIC BLOOD PRESSURE: 89 MMHG

## 2018-02-26 VITALS — DIASTOLIC BLOOD PRESSURE: 79 MMHG | SYSTOLIC BLOOD PRESSURE: 151 MMHG

## 2018-02-26 VITALS — DIASTOLIC BLOOD PRESSURE: 92 MMHG | SYSTOLIC BLOOD PRESSURE: 173 MMHG

## 2018-02-26 VITALS — DIASTOLIC BLOOD PRESSURE: 88 MMHG | SYSTOLIC BLOOD PRESSURE: 150 MMHG

## 2018-02-26 VITALS — SYSTOLIC BLOOD PRESSURE: 146 MMHG | DIASTOLIC BLOOD PRESSURE: 81 MMHG

## 2018-02-26 LAB
ANION GAP SERPL CALC-SCNC: 18.9 MMOL/L (ref 8–16)
BASOPHILS NFR BLD AUTO: 0.1 % (ref 0–2)
BUN SERPL-MCNC: 74 MG/DL (ref 7–18)
CALCIUM SERPL-MCNC: 8.1 MG/DL (ref 8.5–10.1)
CHLORIDE SERPL-SCNC: 97 MMOL/L (ref 98–107)
CO2 SERPL-SCNC: 23.9 MMOL/L (ref 21–32)
CREAT SERPL-MCNC: 16.1 MG/DL (ref 0.6–1.3)
EOSINOPHIL NFR BLD: 1.8 % (ref 0–7)
ERYTHROCYTE [DISTWIDTH] IN BLOOD BY AUTOMATED COUNT: 13.8 % (ref 11.5–14.5)
GLUCOSE - BODY FLUID: 154 MG/DL
GLUCOSE SERPL-MCNC: 122 MG/DL (ref 74–106)
HCT VFR BLD CALC: 32.7 % (ref 36–48)
HGB BLD-MCNC: 10.1 G/DL (ref 12–16)
IMM GRANULOCYTES NFR BLD: 0.5 % (ref 0–5)
LYMPHOCYTES NFR BLD AUTO: 6.9 % (ref 15–50)
MCH RBC QN AUTO: 28.5 PG (ref 26–34)
MCHC RBC AUTO-ENTMCNC: 30.9 G/DL (ref 31–37)
MCV RBC: 92.1 FL (ref 80–100)
MONOCYTES NFR BLD: 11.5 % (ref 2–11)
NEUTROPHILS NFR BLD AUTO: 79.2 % (ref 40–80)
OSMOLALITY SERPL CALC.SUM OF ELEC: 294 MOSM/KG (ref 275–300)
PLATELET # BLD: 224 10X3/UL (ref 130–400)
PMV BLD AUTO: 9.1 FL (ref 7.4–10.4)
POTASSIUM SERPL-SCNC: 3.8 MMOL/L (ref 3.5–5.1)
PROTEIN - BODY FLUID: 3 G/DL
RBC # BLD AUTO: 3.55 10X6/UL (ref 4–5.4)
SODIUM SERPL-SCNC: 136 MMOL/L (ref 136–145)
WBC # BLD AUTO: 8.7 10X3/UL (ref 4.8–10.8)

## 2018-02-27 VITALS — SYSTOLIC BLOOD PRESSURE: 149 MMHG | DIASTOLIC BLOOD PRESSURE: 88 MMHG

## 2018-02-27 VITALS — SYSTOLIC BLOOD PRESSURE: 140 MMHG | DIASTOLIC BLOOD PRESSURE: 76 MMHG

## 2018-02-27 VITALS — DIASTOLIC BLOOD PRESSURE: 88 MMHG | SYSTOLIC BLOOD PRESSURE: 147 MMHG

## 2018-02-27 VITALS — SYSTOLIC BLOOD PRESSURE: 147 MMHG | DIASTOLIC BLOOD PRESSURE: 80 MMHG

## 2018-02-27 LAB
ANION GAP SERPL CALC-SCNC: 17.3 MMOL/L (ref 8–16)
BASOPHILS NFR BLD AUTO: 0.1 % (ref 0–2)
BUN SERPL-MCNC: 64 MG/DL (ref 7–18)
CALCIUM SERPL-MCNC: 8.7 MG/DL (ref 8.5–10.1)
CHLORIDE SERPL-SCNC: 96 MMOL/L (ref 98–107)
CO2 SERPL-SCNC: 26.3 MMOL/L (ref 21–32)
CREAT SERPL-MCNC: 15.4 MG/DL (ref 0.6–1.3)
EOSINOPHIL NFR BLD: 3.5 % (ref 0–7)
ERYTHROCYTE [DISTWIDTH] IN BLOOD BY AUTOMATED COUNT: 13.8 % (ref 11.5–14.5)
GLUCOSE SERPL-MCNC: 137 MG/DL (ref 74–106)
HCT VFR BLD CALC: 33.1 % (ref 36–48)
HGB BLD-MCNC: 10.1 G/DL (ref 12–16)
IMM GRANULOCYTES NFR BLD: 0.2 % (ref 0–5)
LDH1 SERPL-CCNC: 198 U/L (ref 81–234)
LYMPHOCYTES NFR BLD AUTO: 9 % (ref 15–50)
MCH RBC QN AUTO: 28.2 PG (ref 26–34)
MCHC RBC AUTO-ENTMCNC: 30.5 G/DL (ref 31–37)
MCV RBC: 92.5 FL (ref 80–100)
MONOCYTES NFR BLD: 11.2 % (ref 2–11)
NEUTROPHILS NFR BLD AUTO: 76 % (ref 40–80)
OSMOLALITY SERPL CALC.SUM OF ELEC: 291 MOSM/KG (ref 275–300)
PLATELET # BLD: 262 10X3/UL (ref 130–400)
PMV BLD AUTO: 9.4 FL (ref 7.4–10.4)
POTASSIUM SERPL-SCNC: 3.6 MMOL/L (ref 3.5–5.1)
RBC # BLD AUTO: 3.58 10X6/UL (ref 4–5.4)
SODIUM SERPL-SCNC: 136 MMOL/L (ref 136–145)
WBC # BLD AUTO: 9.2 10X3/UL (ref 4.8–10.8)

## 2018-03-14 ENCOUNTER — HOSPITAL ENCOUNTER (INPATIENT)
Dept: HOSPITAL 84 - D.ER | Age: 48
LOS: 5 days | Discharge: HOME | DRG: 291 | End: 2018-03-19
Attending: INTERNAL MEDICINE | Admitting: INTERNAL MEDICINE
Payer: OTHER GOVERNMENT

## 2018-03-14 VITALS
WEIGHT: 271.77 LBS | BODY MASS INDEX: 41.19 KG/M2 | BODY MASS INDEX: 41.19 KG/M2 | HEIGHT: 68 IN | BODY MASS INDEX: 41.19 KG/M2 | WEIGHT: 271.77 LBS | HEIGHT: 68 IN

## 2018-03-14 VITALS — DIASTOLIC BLOOD PRESSURE: 92 MMHG | SYSTOLIC BLOOD PRESSURE: 163 MMHG

## 2018-03-14 DIAGNOSIS — Q60.2: ICD-10-CM

## 2018-03-14 DIAGNOSIS — J90: ICD-10-CM

## 2018-03-14 DIAGNOSIS — J98.11: ICD-10-CM

## 2018-03-14 DIAGNOSIS — I34.0: ICD-10-CM
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
## 2018-03-14 DIAGNOSIS — I13.2: Primary | ICD-10-CM

## 2018-03-14 DIAGNOSIS — N18.6: ICD-10-CM

## 2018-03-14 DIAGNOSIS — E87.5: ICD-10-CM

## 2018-03-14 DIAGNOSIS — Z99.2: ICD-10-CM

## 2018-03-14 DIAGNOSIS — E87.1: ICD-10-CM

## 2018-03-14 DIAGNOSIS — T85.611A: ICD-10-CM

## 2018-03-14 DIAGNOSIS — N25.81: ICD-10-CM

## 2018-03-14 DIAGNOSIS — I50.33: ICD-10-CM

## 2018-03-14 DIAGNOSIS — D64.9: ICD-10-CM

## 2018-03-14 LAB
ALBUMIN SERPL-MCNC: 3 G/DL (ref 3.4–5)
ALP SERPL-CCNC: 42 U/L (ref 46–116)
ALT SERPL-CCNC: 18 U/L (ref 10–68)
ANION GAP SERPL CALC-SCNC: 20.4 MMOL/L (ref 8–16)
BASOPHILS NFR BLD AUTO: 0.2 % (ref 0–2)
BILIRUB SERPL-MCNC: 0.4 MG/DL (ref 0.2–1.3)
BUN SERPL-MCNC: 125 MG/DL (ref 7–18)
CALCIUM SERPL-MCNC: 9 MG/DL (ref 8.5–10.1)
CHLORIDE SERPL-SCNC: 95 MMOL/L (ref 98–107)
CO2 SERPL-SCNC: 22.5 MMOL/L (ref 21–32)
CREAT SERPL-MCNC: 19.9 MG/DL (ref 0.6–1.3)
EOSINOPHIL NFR BLD: 1.2 % (ref 0–7)
ERYTHROCYTE [DISTWIDTH] IN BLOOD BY AUTOMATED COUNT: 13.1 % (ref 11.5–14.5)
GLOBULIN SER-MCNC: 3.9 G/L
GLUCOSE SERPL-MCNC: 105 MG/DL (ref 74–106)
HCT VFR BLD CALC: 32.7 % (ref 36–48)
HGB BLD-MCNC: 9.9 G/DL (ref 12–16)
IMM GRANULOCYTES NFR BLD: 0.2 % (ref 0–5)
LYMPHOCYTES NFR BLD AUTO: 8.8 % (ref 15–50)
MCH RBC QN AUTO: 28 PG (ref 26–34)
MCHC RBC AUTO-ENTMCNC: 30.3 G/DL (ref 31–37)
MCV RBC: 92.4 FL (ref 80–100)
MONOCYTES NFR BLD: 6 % (ref 2–11)
NEUTROPHILS NFR BLD AUTO: 83.6 % (ref 40–80)
NT-PROBNP SERPL-MCNC: (no result) PG/ML (ref 0–125)
OSMOLALITY SERPL CALC.SUM OF ELEC: 304 MOSM/KG (ref 275–300)
PLATELET # BLD: 329 10X3/UL (ref 130–400)
PMV BLD AUTO: 8.9 FL (ref 7.4–10.4)
POTASSIUM SERPL-SCNC: 5.9 MMOL/L (ref 3.5–5.1)
PROT SERPL-MCNC: 6.9 G/DL (ref 6.4–8.2)
RBC # BLD AUTO: 3.54 10X6/UL (ref 4–5.4)
SODIUM SERPL-SCNC: 132 MMOL/L (ref 136–145)
TROPONIN I SERPL-MCNC: < 0.017 NG/ML (ref 0–0.06)
WBC # BLD AUTO: 8.5 10X3/UL (ref 4.8–10.8)

## 2018-03-15 VITALS — SYSTOLIC BLOOD PRESSURE: 144 MMHG | DIASTOLIC BLOOD PRESSURE: 88 MMHG

## 2018-03-15 VITALS — SYSTOLIC BLOOD PRESSURE: 130 MMHG | DIASTOLIC BLOOD PRESSURE: 78 MMHG

## 2018-03-15 VITALS — DIASTOLIC BLOOD PRESSURE: 89 MMHG | SYSTOLIC BLOOD PRESSURE: 159 MMHG

## 2018-03-15 VITALS — SYSTOLIC BLOOD PRESSURE: 131 MMHG | DIASTOLIC BLOOD PRESSURE: 74 MMHG

## 2018-03-15 VITALS — SYSTOLIC BLOOD PRESSURE: 152 MMHG | DIASTOLIC BLOOD PRESSURE: 91 MMHG

## 2018-03-15 VITALS — DIASTOLIC BLOOD PRESSURE: 81 MMHG | SYSTOLIC BLOOD PRESSURE: 150 MMHG

## 2018-03-15 LAB
ANION GAP SERPL CALC-SCNC: 23.7 MMOL/L (ref 8–16)
BASOPHILS NFR BLD AUTO: 0.6 % (ref 0–2)
BUN SERPL-MCNC: 131 MG/DL (ref 7–18)
CALCIUM SERPL-MCNC: 8.6 MG/DL (ref 8.5–10.1)
CHLORIDE SERPL-SCNC: 94 MMOL/L (ref 98–107)
CO2 SERPL-SCNC: 20.2 MMOL/L (ref 21–32)
CREAT SERPL-MCNC: 20.2 MG/DL (ref 0.6–1.3)
EOSINOPHIL NFR BLD: 3.5 % (ref 0–7)
ERYTHROCYTE [DISTWIDTH] IN BLOOD BY AUTOMATED COUNT: 13.2 % (ref 11.5–14.5)
GLUCOSE SERPL-MCNC: 83 MG/DL (ref 74–106)
HCT VFR BLD CALC: 31.8 % (ref 36–48)
HGB BLD-MCNC: 9.6 G/DL (ref 12–16)
IMM GRANULOCYTES NFR BLD: 0.3 % (ref 0–5)
LYMPHOCYTES NFR BLD AUTO: 10.7 % (ref 15–50)
MCH RBC QN AUTO: 27.8 PG (ref 26–34)
MCHC RBC AUTO-ENTMCNC: 30.2 G/DL (ref 31–37)
MCV RBC: 92.2 FL (ref 80–100)
MONOCYTES NFR BLD: 8 % (ref 2–11)
NEUTROPHILS NFR BLD AUTO: 76.9 % (ref 40–80)
OSMOLALITY SERPL CALC.SUM OF ELEC: 305 MOSM/KG (ref 275–300)
PLATELET # BLD: 317 10X3/UL (ref 130–400)
PMV BLD AUTO: 9.2 FL (ref 7.4–10.4)
POTASSIUM SERPL-SCNC: 5.9 MMOL/L (ref 3.5–5.1)
RBC # BLD AUTO: 3.45 10X6/UL (ref 4–5.4)
SODIUM SERPL-SCNC: 132 MMOL/L (ref 136–145)
WBC # BLD AUTO: 6.3 10X3/UL (ref 4.8–10.8)

## 2018-03-16 VITALS — SYSTOLIC BLOOD PRESSURE: 118 MMHG | DIASTOLIC BLOOD PRESSURE: 70 MMHG

## 2018-03-16 VITALS — SYSTOLIC BLOOD PRESSURE: 120 MMHG | DIASTOLIC BLOOD PRESSURE: 88 MMHG

## 2018-03-16 VITALS — DIASTOLIC BLOOD PRESSURE: 78 MMHG | SYSTOLIC BLOOD PRESSURE: 128 MMHG

## 2018-03-16 VITALS — DIASTOLIC BLOOD PRESSURE: 81 MMHG | SYSTOLIC BLOOD PRESSURE: 130 MMHG

## 2018-03-16 VITALS — SYSTOLIC BLOOD PRESSURE: 158 MMHG | DIASTOLIC BLOOD PRESSURE: 99 MMHG

## 2018-03-16 VITALS — DIASTOLIC BLOOD PRESSURE: 90 MMHG | SYSTOLIC BLOOD PRESSURE: 156 MMHG

## 2018-03-16 VITALS — SYSTOLIC BLOOD PRESSURE: 117 MMHG | DIASTOLIC BLOOD PRESSURE: 79 MMHG

## 2018-03-16 VITALS — DIASTOLIC BLOOD PRESSURE: 80 MMHG | SYSTOLIC BLOOD PRESSURE: 84 MMHG

## 2018-03-16 VITALS — SYSTOLIC BLOOD PRESSURE: 156 MMHG | DIASTOLIC BLOOD PRESSURE: 86 MMHG

## 2018-03-16 VITALS — SYSTOLIC BLOOD PRESSURE: 116 MMHG | DIASTOLIC BLOOD PRESSURE: 68 MMHG

## 2018-03-16 VITALS — DIASTOLIC BLOOD PRESSURE: 82 MMHG | SYSTOLIC BLOOD PRESSURE: 146 MMHG

## 2018-03-16 LAB
ANION GAP SERPL CALC-SCNC: 21.7 MMOL/L (ref 8–16)
APTT BLD: 28.3 SECONDS (ref 22.8–39.4)
BASOPHILS NFR BLD AUTO: 0.3 % (ref 0–2)
BUN SERPL-MCNC: 126 MG/DL (ref 7–18)
CALCIUM SERPL-MCNC: 8.2 MG/DL (ref 8.5–10.1)
CHLORIDE SERPL-SCNC: 96 MMOL/L (ref 98–107)
CHOLEST FLD-MCNC: 53 MG/DL
CO2 SERPL-SCNC: 22.4 MMOL/L (ref 21–32)
CREAT SERPL-MCNC: 19.1 MG/DL (ref 0.6–1.3)
EOSINOPHIL NFR BLD: 3.3 % (ref 0–7)
EOSINOPHIL NFR FLD MANUAL: 3 %
ERYTHROCYTE [DISTWIDTH] IN BLOOD BY AUTOMATED COUNT: 13.3 % (ref 11.5–14.5)
GLUCOSE SERPL-MCNC: 133 MG/DL (ref 74–106)
HCT VFR BLD CALC: 31.4 % (ref 36–48)
HGB BLD-MCNC: 9.6 G/DL (ref 12–16)
IMM GRANULOCYTES NFR BLD: 0.3 % (ref 0–5)
INR PPP: 0.95 (ref 0.85–1.17)
LDH1 SERPL-CCNC: 238 U/L (ref 81–234)
LYMPHOCYTES NFR BLD AUTO: 10.1 % (ref 15–50)
MACROPHAGES NFR FLD MANUAL: 65 %
MCH RBC QN AUTO: 27.7 PG (ref 26–34)
MCHC RBC AUTO-ENTMCNC: 30.6 G/DL (ref 31–37)
MCV RBC: 90.5 FL (ref 80–100)
MESOTHL CELL NFR FLD MANUAL: 10 %
MONOCYTES NFR BLD: 12.1 % (ref 2–11)
NEUTROPHILS NFR BLD AUTO: 73.9 % (ref 40–80)
NEUTROPHILS NFR FLD MANUAL: 11 %
OSMOLALITY SERPL CALC.SUM OF ELEC: 312 MOSM/KG (ref 275–300)
PLATELET # BLD: 294 10X3/UL (ref 130–400)
PMV BLD AUTO: 9 FL (ref 7.4–10.4)
POTASSIUM SERPL-SCNC: 5.1 MMOL/L (ref 3.5–5.1)
PROT SERPL-MCNC: 6.8 G/DL (ref 6.4–8.2)
PROTEIN - BODY FLUID: 3 G/DL
PROTHROMBIN TIME: 12.3 SECONDS (ref 11.6–15)
RBC # BLD AUTO: 3.47 10X6/UL (ref 4–5.4)
SODIUM SERPL-SCNC: 135 MMOL/L (ref 136–145)
TRIGL FLD-MCNC: 9 MG/DL
WBC # BLD AUTO: 6.6 10X3/UL (ref 4.8–10.8)

## 2018-03-16 PROCEDURE — 0W9B3ZZ DRAINAGE OF LEFT PLEURAL CAVITY, PERCUTANEOUS APPROACH: ICD-10-PCS | Performed by: RADIOLOGY

## 2018-03-17 VITALS — SYSTOLIC BLOOD PRESSURE: 155 MMHG | DIASTOLIC BLOOD PRESSURE: 93 MMHG

## 2018-03-17 VITALS — DIASTOLIC BLOOD PRESSURE: 70 MMHG | SYSTOLIC BLOOD PRESSURE: 132 MMHG

## 2018-03-17 VITALS — SYSTOLIC BLOOD PRESSURE: 149 MMHG | DIASTOLIC BLOOD PRESSURE: 93 MMHG

## 2018-03-17 VITALS — SYSTOLIC BLOOD PRESSURE: 163 MMHG | DIASTOLIC BLOOD PRESSURE: 92 MMHG

## 2018-03-17 VITALS — DIASTOLIC BLOOD PRESSURE: 89 MMHG | SYSTOLIC BLOOD PRESSURE: 166 MMHG

## 2018-03-17 LAB
ANION GAP SERPL CALC-SCNC: 20.9 MMOL/L (ref 8–16)
BASOPHILS NFR BLD AUTO: 0.3 % (ref 0–2)
BUN SERPL-MCNC: 103 MG/DL (ref 7–18)
CALCIUM SERPL-MCNC: 7.6 MG/DL (ref 8.5–10.1)
CHLORIDE SERPL-SCNC: 96 MMOL/L (ref 98–107)
CO2 SERPL-SCNC: 22.4 MMOL/L (ref 21–32)
CREAT SERPL-MCNC: 18.2 MG/DL (ref 0.6–1.3)
EOSINOPHIL NFR BLD: 2.5 % (ref 0–7)
ERYTHROCYTE [DISTWIDTH] IN BLOOD BY AUTOMATED COUNT: 13.3 % (ref 11.5–14.5)
GLUCOSE SERPL-MCNC: 156 MG/DL (ref 74–106)
HCT VFR BLD CALC: 31.9 % (ref 36–48)
HGB BLD-MCNC: 9.7 G/DL (ref 12–16)
IMM GRANULOCYTES NFR BLD: 0.3 % (ref 0–5)
LYMPHOCYTES NFR BLD AUTO: 10.3 % (ref 15–50)
MCH RBC QN AUTO: 27.6 PG (ref 26–34)
MCHC RBC AUTO-ENTMCNC: 30.4 G/DL (ref 31–37)
MCV RBC: 90.6 FL (ref 80–100)
MONOCYTES NFR BLD: 9.1 % (ref 2–11)
NEUTROPHILS NFR BLD AUTO: 77.5 % (ref 40–80)
OSMOLALITY SERPL CALC.SUM OF ELEC: 304 MOSM/KG (ref 275–300)
PLATELET # BLD: 286 10X3/UL (ref 130–400)
PMV BLD AUTO: 9.2 FL (ref 7.4–10.4)
POTASSIUM SERPL-SCNC: 4.3 MMOL/L (ref 3.5–5.1)
RBC # BLD AUTO: 3.52 10X6/UL (ref 4–5.4)
SODIUM SERPL-SCNC: 135 MMOL/L (ref 136–145)
WBC # BLD AUTO: 6.9 10X3/UL (ref 4.8–10.8)

## 2018-03-18 VITALS — SYSTOLIC BLOOD PRESSURE: 91 MMHG | DIASTOLIC BLOOD PRESSURE: 50 MMHG

## 2018-03-18 VITALS — DIASTOLIC BLOOD PRESSURE: 79 MMHG | SYSTOLIC BLOOD PRESSURE: 142 MMHG

## 2018-03-18 VITALS — DIASTOLIC BLOOD PRESSURE: 98 MMHG | SYSTOLIC BLOOD PRESSURE: 161 MMHG

## 2018-03-18 VITALS — SYSTOLIC BLOOD PRESSURE: 149 MMHG | DIASTOLIC BLOOD PRESSURE: 92 MMHG

## 2018-03-18 VITALS — SYSTOLIC BLOOD PRESSURE: 172 MMHG | DIASTOLIC BLOOD PRESSURE: 95 MMHG

## 2018-03-18 VITALS — SYSTOLIC BLOOD PRESSURE: 168 MMHG | DIASTOLIC BLOOD PRESSURE: 98 MMHG

## 2018-03-18 LAB
ANION GAP SERPL CALC-SCNC: 19.4 MMOL/L (ref 8–16)
BASOPHILS NFR BLD AUTO: 0.3 % (ref 0–2)
BUN SERPL-MCNC: 90 MG/DL (ref 7–18)
CALCIUM SERPL-MCNC: 7.6 MG/DL (ref 8.5–10.1)
CHLORIDE SERPL-SCNC: 96 MMOL/L (ref 98–107)
CO2 SERPL-SCNC: 24.6 MMOL/L (ref 21–32)
CREAT SERPL-MCNC: 16.4 MG/DL (ref 0.6–1.3)
EOSINOPHIL NFR BLD: 1.9 % (ref 0–7)
ERYTHROCYTE [DISTWIDTH] IN BLOOD BY AUTOMATED COUNT: 13.2 % (ref 11.5–14.5)
GLUCOSE SERPL-MCNC: 141 MG/DL (ref 74–106)
HCT VFR BLD CALC: 33 % (ref 36–48)
HGB BLD-MCNC: 10.2 G/DL (ref 12–16)
IMM GRANULOCYTES NFR BLD: 0.3 % (ref 0–5)
LYMPHOCYTES NFR BLD AUTO: 13.4 % (ref 15–50)
MCH RBC QN AUTO: 27.9 PG (ref 26–34)
MCHC RBC AUTO-ENTMCNC: 30.9 G/DL (ref 31–37)
MCV RBC: 90.2 FL (ref 80–100)
MONOCYTES NFR BLD: 10.1 % (ref 2–11)
NEUTROPHILS NFR BLD AUTO: 74 % (ref 40–80)
OSMOLALITY SERPL CALC.SUM OF ELEC: 301 MOSM/KG (ref 275–300)
PLATELET # BLD: 280 10X3/UL (ref 130–400)
PMV BLD AUTO: 8.8 FL (ref 7.4–10.4)
POTASSIUM SERPL-SCNC: 4 MMOL/L (ref 3.5–5.1)
RBC # BLD AUTO: 3.66 10X6/UL (ref 4–5.4)
SODIUM SERPL-SCNC: 136 MMOL/L (ref 136–145)
WBC # BLD AUTO: 6.3 10X3/UL (ref 4.8–10.8)

## 2018-03-19 VITALS — DIASTOLIC BLOOD PRESSURE: 104 MMHG | SYSTOLIC BLOOD PRESSURE: 153 MMHG

## 2018-03-19 VITALS — SYSTOLIC BLOOD PRESSURE: 150 MMHG | DIASTOLIC BLOOD PRESSURE: 118 MMHG

## 2018-03-19 VITALS — DIASTOLIC BLOOD PRESSURE: 84 MMHG | SYSTOLIC BLOOD PRESSURE: 157 MMHG

## 2018-03-19 LAB
ANION GAP SERPL CALC-SCNC: 17.4 MMOL/L (ref 8–16)
BASOPHILS NFR BLD AUTO: 0.4 % (ref 0–2)
BUN SERPL-MCNC: 77 MG/DL (ref 7–18)
CALCIUM SERPL-MCNC: 7.8 MG/DL (ref 8.5–10.1)
CHLORIDE SERPL-SCNC: 98 MMOL/L (ref 98–107)
CO2 SERPL-SCNC: 25.4 MMOL/L (ref 21–32)
CREAT SERPL-MCNC: 15.5 MG/DL (ref 0.6–1.3)
EOSINOPHIL NFR BLD: 3 % (ref 0–7)
ERYTHROCYTE [DISTWIDTH] IN BLOOD BY AUTOMATED COUNT: 13.1 % (ref 11.5–14.5)
GLUCOSE SERPL-MCNC: 106 MG/DL (ref 74–106)
HCT VFR BLD CALC: 33 % (ref 36–48)
HGB BLD-MCNC: 10.3 G/DL (ref 12–16)
IMM GRANULOCYTES NFR BLD: 0.2 % (ref 0–5)
LYMPHOCYTES NFR BLD AUTO: 14.9 % (ref 15–50)
MCH RBC QN AUTO: 27.9 PG (ref 26–34)
MCHC RBC AUTO-ENTMCNC: 31.2 G/DL (ref 31–37)
MCV RBC: 89.4 FL (ref 80–100)
MONOCYTES NFR BLD: 12.5 % (ref 2–11)
NEUTROPHILS NFR BLD AUTO: 69 % (ref 40–80)
OSMOLALITY SERPL CALC.SUM OF ELEC: 296 MOSM/KG (ref 275–300)
PLATELET # BLD: 286 10X3/UL (ref 130–400)
PMV BLD AUTO: 9.1 FL (ref 7.4–10.4)
POTASSIUM SERPL-SCNC: 3.8 MMOL/L (ref 3.5–5.1)
RBC # BLD AUTO: 3.69 10X6/UL (ref 4–5.4)
SODIUM SERPL-SCNC: 137 MMOL/L (ref 136–145)
WBC # BLD AUTO: 5.7 10X3/UL (ref 4.8–10.8)